# Patient Record
Sex: FEMALE | URBAN - METROPOLITAN AREA
[De-identification: names, ages, dates, MRNs, and addresses within clinical notes are randomized per-mention and may not be internally consistent; named-entity substitution may affect disease eponyms.]

---

## 2024-01-16 ENCOUNTER — LAB OUTSIDE AN ENCOUNTER (OUTPATIENT)
Dept: URBAN - METROPOLITAN AREA CLINIC 115 | Facility: CLINIC | Age: 54
End: 2024-01-16

## 2024-01-16 ENCOUNTER — OFFICE VISIT (OUTPATIENT)
Dept: URBAN - METROPOLITAN AREA CLINIC 115 | Facility: CLINIC | Age: 54
End: 2024-01-16
Payer: COMMERCIAL

## 2024-01-16 VITALS
HEART RATE: 64 BPM | DIASTOLIC BLOOD PRESSURE: 70 MMHG | SYSTOLIC BLOOD PRESSURE: 124 MMHG | TEMPERATURE: 97.9 F | RESPIRATION RATE: 18 BRPM | BODY MASS INDEX: 30.58 KG/M2 | HEIGHT: 61 IN | WEIGHT: 162 LBS

## 2024-01-16 DIAGNOSIS — K74.60 CIRRHOSIS OF LIVER WITHOUT ASCITES, UNSPECIFIED HEPATIC CIRRHOSIS TYPE: ICD-10-CM

## 2024-01-16 DIAGNOSIS — Z12.11 SCREENING FOR COLON CANCER: ICD-10-CM

## 2024-01-16 DIAGNOSIS — E66.9 OBESITY (BMI 30.0-34.9): ICD-10-CM

## 2024-01-16 DIAGNOSIS — R93.2 ABNORMAL LIVER CT: ICD-10-CM

## 2024-01-16 PROBLEM — 305058001: Status: ACTIVE | Noted: 2024-01-16

## 2024-01-16 PROBLEM — 15634181000119107: Status: ACTIVE | Noted: 2024-01-16

## 2024-01-16 PROBLEM — 19943007: Status: ACTIVE | Noted: 2024-01-16

## 2024-01-16 PROBLEM — 443371000124107: Status: ACTIVE | Noted: 2024-01-16

## 2024-01-16 PROCEDURE — 99204 OFFICE O/P NEW MOD 45 MIN: CPT | Performed by: INTERNAL MEDICINE

## 2024-01-16 RX ORDER — METFORMIN HYDROCHLORIDE 500 MG/1
1 TABLET WITH A MEAL TABLET, FILM COATED ORAL ONCE A DAY
Status: ACTIVE | COMMUNITY

## 2024-01-16 NOTE — HPI-TODAY'S VISIT:
52 y/o  female with obesity, diabetes type II on metformin ,pancytopenia that was refer here by Dr. pryor given she was found to have cirrhotic liver on CT and signs of portal HTN givne splenomegaly and low plat count ,varices on CT. She denies jaundice but bili was 3.1 more indirect that direct that could be secondary to Gilbert.Refer she know about abnormal cell count at [place] where she underwent BM biopsy that was normal as per patient and no treatment was recommended but they told her that she had fatty liver.She denies alcohol abuse ever,smoking or IV drugs. She never had colonoscopy but she think she had some type of EGD for bleeding at [place] but she dont remember the indication.Abdomen is soft and no distention noted.no leg swelling.hepB and C negative.Susan, Rheumatoid  factor,fibrinogen WNL, low haptoglobin but normal fee hgb, PNH negative,high LDH.

## 2024-01-18 LAB
ACTIN (SMOOTH MUSCLE) ANTIBODY (IGG): <20
ALBUMIN/GLOBULIN RATIO: 1.4
ALBUMIN: 3.9
ALKALINE PHOSPHATASE: 159
ALT (SGPT): 52
AST (SGOT): 48
BILIRUBIN, DIRECT: 0.3
BILIRUBIN, INDIRECT: 1
BILIRUBIN, TOTAL: 1.3
GLOBULIN: 2.8
IMMUNOGLOBULIN G, QN, SERUM: 1428
MITOCHONDRIAL (M2) ANTIBODY: <=20
PROTEIN, TOTAL: 6.7

## 2024-02-27 ENCOUNTER — OV CON (OUTPATIENT)
Dept: URBAN - METROPOLITAN AREA CLINIC 115 | Facility: CLINIC | Age: 54
End: 2024-02-27
Payer: COMMERCIAL

## 2024-02-27 VITALS
WEIGHT: 161.2 LBS | HEIGHT: 61 IN | BODY MASS INDEX: 30.43 KG/M2 | HEART RATE: 61 BPM | TEMPERATURE: 97.9 F | SYSTOLIC BLOOD PRESSURE: 114 MMHG | RESPIRATION RATE: 15 BRPM | DIASTOLIC BLOOD PRESSURE: 69 MMHG

## 2024-02-27 DIAGNOSIS — E66.9 OBESITY (BMI 30.0-34.9): ICD-10-CM

## 2024-02-27 DIAGNOSIS — E80.4 GILBERT SYNDROME: ICD-10-CM

## 2024-02-27 DIAGNOSIS — K74.60 CIRRHOSIS OF LIVER WITHOUT ASCITES, UNSPECIFIED HEPATIC CIRRHOSIS TYPE: ICD-10-CM

## 2024-02-27 PROBLEM — 27503000: Status: ACTIVE | Noted: 2024-02-27

## 2024-02-27 PROCEDURE — 99213 OFFICE O/P EST LOW 20 MIN: CPT | Performed by: INTERNAL MEDICINE

## 2024-02-27 RX ORDER — METFORMIN HYDROCHLORIDE 500 MG/1
1 TABLET WITH A MEAL TABLET, FILM COATED ORAL ONCE A DAY
Status: ACTIVE | COMMUNITY

## 2024-02-27 NOTE — HPI-TODAY'S VISIT:
livr work-up have been negative for Viral hepatitis or Autoimmune liver disease,she denies alcohol use. EGD and colo on schedule, liver biopsy requested still pending,most likely NAFLD. CT with no HCC. more indirect bili than direct consistent with Gilbert syndrome. total bili 1.3 anicteric. EGD anc colo on schedule 3/20/24 at [facility] [place]. Denies black stools or rectal bleeding. Patient works full time for [employer].

## 2024-02-28 ENCOUNTER — LAB (OUTPATIENT)
Dept: URBAN - METROPOLITAN AREA CLINIC 82 | Facility: CLINIC | Age: 54
End: 2024-02-28

## 2024-02-28 LAB
A/G RATIO: 1.5
ALBUMIN: 4
ALKALINE PHOSPHATASE: 139
ALT (SGPT): 37
AST (SGOT): 41
BILIRUBIN, TOTAL: 1.7
BUN/CREATININE RATIO: 24
BUN: 8
CALCIUM: 9.1
CARBON DIOXIDE, TOTAL: 26
CHLORIDE: 107
CREATININE: 0.34
EGFR: 125
GLOBULIN, TOTAL: 2.6
GLUCOSE: 100
HEMATOCRIT: 39.4
HEMOGLOBIN: 13.5
HEPATITIS B SURFACE AB IMMUNITY, QN: <5
MCH: 30.5
MCHC: 34.3
MCV: 89.1
MPV: 11
PLATELET COUNT: 67
POTASSIUM: 4
PROTEIN, TOTAL: 6.6
RDW: 13.6
RED BLOOD CELL COUNT: 4.42
SODIUM: 141
WHITE BLOOD CELL COUNT: 1.6

## 2024-03-05 ENCOUNTER — HEP (OUTPATIENT)
Dept: URBAN - METROPOLITAN AREA CLINIC 114 | Facility: CLINIC | Age: 54
End: 2024-03-05
Payer: COMMERCIAL

## 2024-03-05 DIAGNOSIS — Z23 ENCOUNTER FOR IMMUNIZATION: ICD-10-CM

## 2024-03-05 PROCEDURE — 90746 HEPB VACCINE 3 DOSE ADULT IM: CPT | Performed by: INTERNAL MEDICINE

## 2024-03-05 PROCEDURE — 90743 HEPB VACC 2 DOSE ADOLESC IM: CPT | Performed by: INTERNAL MEDICINE

## 2024-03-05 PROCEDURE — 90471 IMMUNIZATION ADMIN: CPT | Performed by: INTERNAL MEDICINE

## 2024-03-05 RX ORDER — METFORMIN HYDROCHLORIDE 500 MG/1
1 TABLET WITH A MEAL TABLET, FILM COATED ORAL ONCE A DAY
Status: ACTIVE | COMMUNITY

## 2024-03-20 ENCOUNTER — COL/EGD (OUTPATIENT)
Dept: URBAN - METROPOLITAN AREA MEDICAL CENTER 24 | Facility: MEDICAL CENTER | Age: 54
End: 2024-03-20

## 2024-04-09 ENCOUNTER — HEP (OUTPATIENT)
Dept: URBAN - METROPOLITAN AREA CLINIC 114 | Facility: CLINIC | Age: 54
End: 2024-04-09
Payer: COMMERCIAL

## 2024-04-09 ENCOUNTER — OV EP (OUTPATIENT)
Dept: URBAN - METROPOLITAN AREA CLINIC 115 | Facility: CLINIC | Age: 54
End: 2024-04-09
Payer: COMMERCIAL

## 2024-04-09 VITALS
HEART RATE: 56 BPM | RESPIRATION RATE: 15 BRPM | HEIGHT: 61 IN | WEIGHT: 167 LBS | SYSTOLIC BLOOD PRESSURE: 120 MMHG | BODY MASS INDEX: 31.53 KG/M2 | DIASTOLIC BLOOD PRESSURE: 69 MMHG | TEMPERATURE: 98 F

## 2024-04-09 DIAGNOSIS — K74.60 CIRRHOSIS OF LIVER WITHOUT ASCITES, UNSPECIFIED HEPATIC CIRRHOSIS TYPE: ICD-10-CM

## 2024-04-09 DIAGNOSIS — Z23 ENCOUNTER FOR IMMUNIZATION: ICD-10-CM

## 2024-04-09 DIAGNOSIS — K80.20 GALLSTONES: ICD-10-CM

## 2024-04-09 DIAGNOSIS — I85.10 ESOPH VARICE OTHER DIS: ICD-10-CM

## 2024-04-09 PROBLEM — 14223005: Status: ACTIVE | Noted: 2024-04-09

## 2024-04-09 PROBLEM — 235919008: Status: ACTIVE | Noted: 2024-04-09

## 2024-04-09 PROCEDURE — 90746 HEPB VACCINE 3 DOSE ADULT IM: CPT | Performed by: INTERNAL MEDICINE

## 2024-04-09 PROCEDURE — 90471 IMMUNIZATION ADMIN: CPT | Performed by: INTERNAL MEDICINE

## 2024-04-09 PROCEDURE — 99214 OFFICE O/P EST MOD 30 MIN: CPT | Performed by: INTERNAL MEDICINE

## 2024-04-09 RX ORDER — METFORMIN HYDROCHLORIDE 500 MG/1
1 TABLET WITH A MEAL TABLET, FILM COATED ORAL ONCE A DAY
Status: ACTIVE | COMMUNITY

## 2024-04-09 NOTE — HPI-TODAY'S VISIT:
EGD with small varices grade I,PHG, colonscopy with 1 hyperplastic polyp and evidence of prior surery on rectum with some stricture.liver biopsy was done 3/18/2024.She got first vaccine for hep B 3/5/2024.Second shot today. i review with her EGD/COLO reports and liver biopsy reports. stage 4 cirrhosis and BILL.She had gallstones on CT 11/2023,will monitor for symptoms of biliary colic.gastric biopsies w/o h pylori

## 2024-05-07 ENCOUNTER — LAB OUTSIDE AN ENCOUNTER (OUTPATIENT)
Dept: URBAN - METROPOLITAN AREA CLINIC 115 | Facility: CLINIC | Age: 54
End: 2024-05-07

## 2024-06-03 ENCOUNTER — OFFICE VISIT (OUTPATIENT)
Dept: URBAN - METROPOLITAN AREA CLINIC 114 | Facility: CLINIC | Age: 54
End: 2024-06-03
Payer: COMMERCIAL

## 2024-06-03 DIAGNOSIS — K74.60 CIRRHOSIS: ICD-10-CM

## 2024-06-03 DIAGNOSIS — K80.20 CHOLELITHIASIS: ICD-10-CM

## 2024-06-03 PROCEDURE — 76705 ECHO EXAM OF ABDOMEN: CPT | Performed by: INTERNAL MEDICINE

## 2024-06-12 ENCOUNTER — TELEPHONE ENCOUNTER (OUTPATIENT)
Dept: URBAN - METROPOLITAN AREA CLINIC 92 | Facility: CLINIC | Age: 54
End: 2024-06-12

## 2024-06-24 ENCOUNTER — TELEPHONE ENCOUNTER (OUTPATIENT)
Dept: URBAN - METROPOLITAN AREA CLINIC 92 | Facility: CLINIC | Age: 54
End: 2024-06-24

## 2024-08-01 ENCOUNTER — TELEPHONE ENCOUNTER (OUTPATIENT)
Dept: URBAN - METROPOLITAN AREA CLINIC 115 | Facility: CLINIC | Age: 54
End: 2024-08-01

## 2024-08-06 ENCOUNTER — TELEPHONE ENCOUNTER (OUTPATIENT)
Dept: URBAN - METROPOLITAN AREA CLINIC 92 | Facility: CLINIC | Age: 54
End: 2024-08-06

## 2024-08-06 PROBLEM — 89580002: Status: ACTIVE | Noted: 2024-08-06

## 2024-08-21 ENCOUNTER — TELEPHONE ENCOUNTER (OUTPATIENT)
Dept: URBAN - METROPOLITAN AREA CLINIC 92 | Facility: CLINIC | Age: 54
End: 2024-08-21

## 2024-08-21 LAB
A/G RATIO: 1.5
ALBUMIN: 3.8
ALKALINE PHOSPHATASE: 129
ALT (SGPT): 30
AST (SGOT): 38
BILIRUBIN, TOTAL: 2.3
BUN/CREATININE RATIO: 22
BUN: 8
CALCIUM: 8.7
CARBON DIOXIDE, TOTAL: 26
CHLORIDE: 108
CREATININE: 0.37
EGFR: 122
GLOBULIN, TOTAL: 2.6
GLUCOSE: 102
HEMATOCRIT: 38.3
HEMOGLOBIN: 12.4
INR: 1.2
MCH: 30.7
MCHC: 32.4
MCV: 94.8
MPV: 9.9
PLATELET COUNT: 65
POTASSIUM: 4.4
PROTEIN, TOTAL: 6.4
PT: 12.3
RDW: 13.8
RED BLOOD CELL COUNT: 4.04
SODIUM: 142
WHITE BLOOD CELL COUNT: 2.1

## 2024-09-09 ENCOUNTER — OFFICE VISIT (OUTPATIENT)
Dept: URBAN - METROPOLITAN AREA CLINIC 114 | Facility: CLINIC | Age: 54
End: 2024-09-09

## 2024-09-17 ENCOUNTER — OFFICE VISIT (OUTPATIENT)
Dept: URBAN - METROPOLITAN AREA CLINIC 114 | Facility: CLINIC | Age: 54
End: 2024-09-17
Payer: COMMERCIAL

## 2024-09-17 DIAGNOSIS — Z23 ENCOUNTER FOR IMMUNIZATION: ICD-10-CM

## 2024-09-17 PROCEDURE — 90471 IMMUNIZATION ADMIN: CPT | Performed by: INTERNAL MEDICINE

## 2024-09-17 PROCEDURE — 90743 HEPB VACC 2 DOSE ADOLESC IM: CPT | Performed by: INTERNAL MEDICINE

## 2024-09-17 PROCEDURE — 90746 HEPB VACCINE 3 DOSE ADULT IM: CPT | Performed by: INTERNAL MEDICINE

## 2024-09-17 RX ORDER — METFORMIN HYDROCHLORIDE 500 MG/1
1 TABLET WITH A MEAL TABLET, FILM COATED ORAL ONCE A DAY
Status: ACTIVE | COMMUNITY

## 2024-10-01 ENCOUNTER — DASHBOARD ENCOUNTERS (OUTPATIENT)
Age: 54
End: 2024-10-01

## 2024-10-01 ENCOUNTER — OFFICE VISIT (OUTPATIENT)
Dept: URBAN - METROPOLITAN AREA CLINIC 115 | Facility: CLINIC | Age: 54
End: 2024-10-01
Payer: COMMERCIAL

## 2024-10-01 VITALS
DIASTOLIC BLOOD PRESSURE: 71 MMHG | WEIGHT: 154.2 LBS | HEART RATE: 55 BPM | TEMPERATURE: 97.7 F | SYSTOLIC BLOOD PRESSURE: 121 MMHG | HEIGHT: 61 IN | RESPIRATION RATE: 15 BRPM | BODY MASS INDEX: 29.11 KG/M2

## 2024-10-01 DIAGNOSIS — K74.69 OTHER CIRRHOSIS OF LIVER: ICD-10-CM

## 2024-10-01 DIAGNOSIS — K80.20 GALLSTONES: ICD-10-CM

## 2024-10-01 PROCEDURE — 99214 OFFICE O/P EST MOD 30 MIN: CPT | Performed by: INTERNAL MEDICINE

## 2024-10-01 RX ORDER — METFORMIN HYDROCHLORIDE 500 MG/1
1 TABLET WITH A MEAL TABLET, FILM COATED ORAL ONCE A DAY
Status: ON HOLD | COMMUNITY

## 2024-10-01 NOTE — HPI-TODAY'S VISIT:
Compensatd cirrhosis w/o ascites,jaundice or HE. EGD completed small varices,U/S 6/2024 with no focal lesions. complete hep b vaccine series.Continue DIabetes managment,She did not drink alcohol.no smoke.She refer today on/off right upper quadrant pain radiating to her back after eating.Will consult surgery for possible lap audra

## 2024-10-02 ENCOUNTER — WEB ENCOUNTER (OUTPATIENT)
Dept: URBAN - METROPOLITAN AREA CLINIC 92 | Facility: CLINIC | Age: 54
End: 2024-10-02

## 2024-10-08 ENCOUNTER — OFFICE VISIT (OUTPATIENT)
Dept: URBAN - METROPOLITAN AREA CLINIC 115 | Facility: CLINIC | Age: 54
End: 2024-10-08

## 2024-12-06 ENCOUNTER — LAB OUTSIDE AN ENCOUNTER (OUTPATIENT)
Dept: URBAN - METROPOLITAN AREA CLINIC 115 | Facility: CLINIC | Age: 54
End: 2024-12-06

## 2024-12-11 LAB
A/G RATIO: 1.7
AFP, SERUM, TUMOR MARKER: 4.5
ALBUMIN: 3.8
ALKALINE PHOSPHATASE: 143
ALT (SGPT): 31
AST (SGOT): 35
BILIRUBIN, TOTAL: 1.6
BUN/CREATININE RATIO: 25
BUN: 8
CALCIUM: 8.6
CARBON DIOXIDE, TOTAL: 26
CHLORIDE: 106
CREATININE: 0.32
EGFR: 126
GLOBULIN, TOTAL: 2.2
GLUCOSE: 99
INR: 1.2
POTASSIUM: 3.8
PROTEIN, TOTAL: 6
PT: 12.4
SODIUM: 140

## 2024-12-12 ENCOUNTER — OFFICE VISIT (OUTPATIENT)
Dept: URBAN - METROPOLITAN AREA CLINIC 114 | Facility: CLINIC | Age: 54
End: 2024-12-12
Payer: COMMERCIAL

## 2024-12-12 ENCOUNTER — WEB ENCOUNTER (OUTPATIENT)
Dept: URBAN - METROPOLITAN AREA CLINIC 92 | Facility: CLINIC | Age: 54
End: 2024-12-12

## 2024-12-12 DIAGNOSIS — K82.8 GALLBLADDER SLUDGE: ICD-10-CM

## 2024-12-12 DIAGNOSIS — K74.60 CIRRHOSIS OF LIVER WITHOUT ASCITES, UNSPECIFIED HEPATIC CIRRHOSIS TYPE: ICD-10-CM

## 2024-12-12 DIAGNOSIS — K80.20 GALLSTONES: ICD-10-CM

## 2024-12-12 PROCEDURE — 76705 ECHO EXAM OF ABDOMEN: CPT | Performed by: INTERNAL MEDICINE

## 2025-04-08 ENCOUNTER — OFFICE VISIT (OUTPATIENT)
Dept: URBAN - METROPOLITAN AREA CLINIC 115 | Facility: CLINIC | Age: 55
End: 2025-04-08
Payer: COMMERCIAL

## 2025-04-08 ENCOUNTER — LAB OUTSIDE AN ENCOUNTER (OUTPATIENT)
Dept: URBAN - METROPOLITAN AREA CLINIC 115 | Facility: CLINIC | Age: 55
End: 2025-04-08

## 2025-04-08 DIAGNOSIS — K74.69 CIRRHOSIS, CRYPTOGENIC: ICD-10-CM

## 2025-04-08 DIAGNOSIS — K80.20 GALLSTONES: ICD-10-CM

## 2025-04-08 DIAGNOSIS — M54.50 LUMBAR BACK PAIN: ICD-10-CM

## 2025-04-08 DIAGNOSIS — I85.00 ESOPHAGEAL VARICES WITHOUT BLEEDING, UNSPECIFIED ESOPHAGEAL VARICES TYPE: ICD-10-CM

## 2025-04-08 PROBLEM — 279039007: Status: ACTIVE | Noted: 2025-04-08

## 2025-04-08 PROCEDURE — 99214 OFFICE O/P EST MOD 30 MIN: CPT | Performed by: INTERNAL MEDICINE

## 2025-04-08 RX ORDER — METFORMIN HYDROCHLORIDE 500 MG/1
1 TABLET WITH A MEAL TABLET, FILM COATED ORAL ONCE A DAY
Status: ON HOLD | COMMUNITY

## 2025-04-08 NOTE — HPI-TODAY'S VISIT:
She had cryptogenic cirrhosis ,euvolemic today.U/S 12/2024 with no HCC,next due 6/2025.Refer lumbar back pain on/off radiating to her right leg posteriously.I refer her to general surgeon but no appointment was schedule.MELD score was low,early for transplant eval will pull the trigger when MELD is above 15.She is retired.Small varices on EGD 3/2024,[place] 2024 hyperplastic polyp.I review recent l;abs from PCP Dr. Jon

## 2025-05-23 PROBLEM — 443371000124107: Status: ACTIVE | Noted: 2025-05-23

## 2025-06-02 ENCOUNTER — LAB OUTSIDE AN ENCOUNTER (OUTPATIENT)
Dept: URBAN - METROPOLITAN AREA CLINIC 115 | Facility: CLINIC | Age: 55
End: 2025-06-02

## 2025-06-12 ENCOUNTER — OFFICE VISIT (OUTPATIENT)
Dept: URBAN - METROPOLITAN AREA CLINIC 114 | Facility: CLINIC | Age: 55
End: 2025-06-12
Payer: COMMERCIAL

## 2025-06-12 DIAGNOSIS — K74.60 CIRRHOSIS OF LIVER WITHOUT ASCITES, UNSPECIFIED HEPATIC CIRRHOSIS TYPE: ICD-10-CM

## 2025-06-12 DIAGNOSIS — K82.8 THICKENING OF WALL OF GALLBLADDER: ICD-10-CM

## 2025-06-12 DIAGNOSIS — K80.20 GALLSTONES: ICD-10-CM

## 2025-06-12 PROCEDURE — 76705 ECHO EXAM OF ABDOMEN: CPT | Performed by: INTERNAL MEDICINE

## 2025-07-24 ENCOUNTER — CLAIMS CREATED FROM THE CLAIM WINDOW (OUTPATIENT)
Dept: URBAN - METROPOLITAN AREA MEDICAL CENTER 24 | Facility: MEDICAL CENTER | Age: 55
End: 2025-07-24

## 2025-07-24 PROCEDURE — 99254 IP/OBS CNSLTJ NEW/EST MOD 60: CPT

## 2025-07-25 ENCOUNTER — CLAIMS CREATED FROM THE CLAIM WINDOW (OUTPATIENT)
Dept: URBAN - METROPOLITAN AREA MEDICAL CENTER 24 | Facility: MEDICAL CENTER | Age: 55
End: 2025-07-25

## 2025-07-25 PROCEDURE — 99232 SBSQ HOSP IP/OBS MODERATE 35: CPT

## 2025-07-26 ENCOUNTER — CLAIMS CREATED FROM THE CLAIM WINDOW (OUTPATIENT)
Dept: URBAN - METROPOLITAN AREA MEDICAL CENTER 24 | Facility: MEDICAL CENTER | Age: 55
End: 2025-07-26

## 2025-07-26 PROCEDURE — 99232 SBSQ HOSP IP/OBS MODERATE 35: CPT

## 2025-07-28 ENCOUNTER — CLAIMS CREATED FROM THE CLAIM WINDOW (OUTPATIENT)
Dept: URBAN - METROPOLITAN AREA MEDICAL CENTER 24 | Facility: MEDICAL CENTER | Age: 55
End: 2025-07-28

## 2025-07-28 PROCEDURE — 99232 SBSQ HOSP IP/OBS MODERATE 35: CPT

## 2025-07-29 ENCOUNTER — TELEPHONE ENCOUNTER (OUTPATIENT)
Dept: URBAN - METROPOLITAN AREA CLINIC 82 | Facility: CLINIC | Age: 55
End: 2025-07-29

## 2025-08-05 ENCOUNTER — OFFICE VISIT (OUTPATIENT)
Dept: URBAN - METROPOLITAN AREA CLINIC 115 | Facility: CLINIC | Age: 55
End: 2025-08-05
Payer: COMMERCIAL

## 2025-08-05 DIAGNOSIS — K80.20 GALLSTONES: ICD-10-CM

## 2025-08-05 DIAGNOSIS — R18.8 ASCITES OF LIVER: ICD-10-CM

## 2025-08-05 DIAGNOSIS — I85.00 ESOPHAGEAL VARICES WITHOUT BLEEDING, UNSPECIFIED ESOPHAGEAL VARICES TYPE: ICD-10-CM

## 2025-08-05 DIAGNOSIS — K74.69 CIRRHOSIS, CRYPTOGENIC: ICD-10-CM

## 2025-08-05 PROCEDURE — 99215 OFFICE O/P EST HI 40 MIN: CPT | Performed by: INTERNAL MEDICINE

## 2025-08-05 RX ORDER — METFORMIN HYDROCHLORIDE 500 MG/1
1 TABLET WITH A MEAL TABLET, FILM COATED ORAL ONCE A DAY
Status: ON HOLD | COMMUNITY

## 2025-08-06 ENCOUNTER — TELEPHONE ENCOUNTER (OUTPATIENT)
Dept: URBAN - METROPOLITAN AREA CLINIC 85 | Facility: CLINIC | Age: 55
End: 2025-08-06

## 2025-08-12 ENCOUNTER — P2P PATIENT RECORD (OUTPATIENT)
Age: 55
End: 2025-08-12